# Patient Record
Sex: MALE | Race: WHITE | NOT HISPANIC OR LATINO | Employment: STUDENT | URBAN - METROPOLITAN AREA
[De-identification: names, ages, dates, MRNs, and addresses within clinical notes are randomized per-mention and may not be internally consistent; named-entity substitution may affect disease eponyms.]

---

## 2024-09-05 ENCOUNTER — OFFICE VISIT (OUTPATIENT)
Dept: PODIATRY | Facility: CLINIC | Age: 24
End: 2024-09-05
Payer: COMMERCIAL

## 2024-09-05 VITALS
SYSTOLIC BLOOD PRESSURE: 128 MMHG | HEART RATE: 82 BPM | DIASTOLIC BLOOD PRESSURE: 67 MMHG | BODY MASS INDEX: 22.73 KG/M2 | HEIGHT: 68 IN | WEIGHT: 150 LBS

## 2024-09-05 DIAGNOSIS — M24.573 EQUINUS CONTRACTURE OF ANKLE: ICD-10-CM

## 2024-09-05 DIAGNOSIS — M79.671 FOOT PAIN, BILATERAL: ICD-10-CM

## 2024-09-05 DIAGNOSIS — M79.672 FOOT PAIN, BILATERAL: ICD-10-CM

## 2024-09-05 DIAGNOSIS — M72.2 PLANTAR FASCIITIS: Primary | ICD-10-CM

## 2024-09-05 PROBLEM — I37.0 NONRHEUMATIC PULMONARY VALVE STENOSIS: Status: ACTIVE | Noted: 2024-09-05

## 2024-09-05 PROCEDURE — 99999 PR PBB SHADOW E&M-NEW PATIENT-LVL II: CPT | Mod: PBBFAC,,, | Performed by: PODIATRIST

## 2024-09-05 RX ORDER — EMTRICITABINE AND TENOFOVIR DISOPROXIL FUMARATE 200; 300 MG/1; MG/1
1 TABLET, FILM COATED ORAL
COMMUNITY
Start: 2024-08-09

## 2024-09-05 RX ORDER — MELOXICAM 15 MG/1
15 TABLET ORAL DAILY
Qty: 30 TABLET | Refills: 0 | Status: SHIPPED | OUTPATIENT
Start: 2024-09-05

## 2024-09-05 RX ORDER — ISOTRETINOIN 40 MG/1
40 CAPSULE ORAL 2 TIMES DAILY
COMMUNITY
Start: 2024-04-24

## 2024-09-05 RX ORDER — ESCITALOPRAM OXALATE 10 MG/1
10 TABLET ORAL
COMMUNITY
Start: 2024-07-15

## 2024-09-05 NOTE — PROGRESS NOTES
Subjective:      Patient ID: Kenji Weldon is a 23 y.o. male.    Chief Complaint: Plantar Fasciitis (Bilateral foot)    Sharp deep pain in the bottom of both heels.  This chronic condition present with periods of worsening improvement over many years.  This exacerbations been gradual onset, worsening over the past 2 months.  Aggravated with increased weight-bearing particularly after rest.  Current medical treatment has included over-the-counter arch supports in his current shoes, he just bought new shoes yet but has not tried them with the inserts.  Self-treatment with stretches has been minimal help.  Denies trauma and surgery both feet    Review of Systems   Constitutional: Negative for chills, diaphoresis, fever, malaise/fatigue and night sweats.   Cardiovascular:  Negative for claudication, cyanosis, leg swelling and syncope.   Skin:  Negative for color change, dry skin, nail changes, rash, suspicious lesions and unusual hair distribution.   Musculoskeletal:  Negative for falls, joint pain, joint swelling, muscle cramps, muscle weakness and stiffness.   Gastrointestinal:  Negative for constipation, diarrhea, nausea and vomiting.   Neurological:  Negative for brief paralysis, disturbances in coordination, focal weakness, numbness, paresthesias, sensory change and tremors.         Objective:      Physical Exam  Constitutional:       General: He is not in acute distress.     Appearance: He is well-developed. He is not diaphoretic.   Cardiovascular:      Pulses:           Popliteal pulses are 2+ on the right side and 2+ on the left side.        Dorsalis pedis pulses are 2+ on the right side and 2+ on the left side.        Posterior tibial pulses are 2+ on the right side and 2+ on the left side.      Comments: Capillary refill 3 seconds all toes/distal feet, all toes/both feet warm to touch.      Negative lymphadenopathy bilateral popliteal fossa and tarsal tunnel.      Negavie lower extremity edema  bilateral.    Musculoskeletal:      Right ankle: No swelling, deformity, ecchymosis or lacerations. Normal range of motion. Normal pulse.      Right Achilles Tendon: Normal. No defects. Martinez's test negative.      Comments: Sharp deep pain to palpation inferior heel right and left at medial calcaneal tubercle without ecchymosis, erythema, edema, or cardinal signs infection, and no signs of trauma.    Ankle dorsiflexion decreased at <10 degrees bilateral with moderate increase with knee flexion bilateral.    Otherwise, Normal angle, base, station of gait. All ten toes without clubbing, cyanosis, or signs of ischemia.  No pain to palpation bilateral lower extremities.  Range of motion, stability, muscle strength, and muscle tone normal bilateral feet and legs.    Lymphadenopathy:      Lower Body: No right inguinal adenopathy. No left inguinal adenopathy.      Comments: Negative lymphadenopathy bilateral popliteal fossa and tarsal tunnel.    Negative lymphangitic streaking bilateral feet/ankles/legs.   Skin:     General: Skin is warm and dry.      Capillary Refill: Capillary refill takes 2 to 3 seconds.      Coloration: Skin is not pale.      Findings: No abrasion, bruising, burn, ecchymosis, erythema, laceration, lesion or rash.      Nails: There is no clubbing.      Comments: Skin is normal age and health appropriate color, turgor, texture, and temperature bilateral lower extremities without ulceration, hyperpigmentation, discoloration, masses nodules or cords palpated.  No ecchymosis, erythema, edema, or cardinal signs of infection bilateral lower extremities.       Neurological:      Mental Status: He is alert and oriented to person, place, and time.      Sensory: No sensory deficit.      Motor: No tremor, atrophy or abnormal muscle tone.      Gait: Gait normal.      Deep Tendon Reflexes:      Reflex Scores:       Patellar reflexes are 2+ on the right side and 2+ on the left side.       Achilles reflexes are 2+  "on the right side and 2+ on the left side.     Comments: Negative tinel sign to percussion sural, superficial peroneal, deep peroneal, saphenous, and posterior tibial nerves right and left ankles and feet.     Psychiatric:         Behavior: Behavior is cooperative.           Assessment:       Encounter Diagnoses   Name Primary?    Plantar fasciitis Yes    Foot pain, bilateral     Equinus contracture of ankle          Plan:       Kenji Viveros" was seen today for plantar fasciitis.    Diagnoses and all orders for this visit:    Plantar fasciitis    Foot pain, bilateral    Equinus contracture of ankle    Other orders  -     meloxicam (MOBIC) 15 MG tablet; Take 1 tablet (15 mg total) by mouth once daily.      I counseled the patient on his conditions, their implications and medical management.        Patient will stretch the tendo achilles complex three times daily as demonstrated in the office.  Literature was dispensed illustrating proper stretching technique.    I applied a plantar rest strapping to the patient's right and left foot to offload symptomatic area, support the arch, and relieve pain.    Patient will obtain over the counter arch supports and wear them in shoes whenever possible.  Athletic shoes intended for walking or running are usually best.    The patient was advised that NSAID-type medications have two very important potential side effects: gastrointestinal irritation including hemorrhage and renal injuries. He was asked to take the medication with food and to stop if he experiences any GI upset. I asked him to call for vomiting, abdominal pain or black/bloody stools. The patient expresses understanding of these issues and questions were answered.    Discussed conservative treatment with shoes of adequate dimensions, material, and style to alleviate symptoms and delay or prevent surgical intervention.    Meloxicam    1 month          Follow up in about 6 weeks (around 10/17/2024).        "

## 2024-09-05 NOTE — PROGRESS NOTES
"       Cardiology Clinic Note  Reason for Visit: PS    HPI:     Kenji Weldon is a 23 y.o. male, who presents for PS.    He was referred from [facility] to establish care for congenital pulmonary stenosis.  He is from [place]. 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  Last seen in cardiology 3-4y ago.    He is active. No symptoms with exercise.    He has been followed for PS and a hole in his heart.    Medical: pulmonic stenosis  Other relevant histories: No family h/o premature cardiac disease.    ROS:    Pertinent ROS included in HPI and below.  PMH:     Patient Active Problem List   Diagnosis    Nonrheumatic pulmonary valve stenosis     Myke  has no past surgical history on file.  Allergies:     Review of patient's allergies indicates:   Allergen Reactions    Peanut Nausea And Vomiting and Swelling     Medications:     Current Outpatient Medications   Medication Instructions    AMNESTEEM 40 mg, Oral, 2 times daily    emtricitabine-tenofovir 200-300 mg (TRUVADA) 200-300 mg Tab 1 tablet, Oral    EScitalopram oxalate (LEXAPRO) 10 mg, Oral     Social History:     Social History     Tobacco Use    Smoking status: Never    Smokeless tobacco: Never   Substance Use Topics    Alcohol use: Not on file     Physical Exam:     BP Readings from Last 3 Encounters:   09/05/24 128/67      Pulse Readings from Last 3 Encounters:   09/05/24 82      Wt Readings from Last 3 Encounters:   09/05/24 1335 68 kg (150 lb)       Constitutional: No apparent distress, conversant  Neck: No jugular venous distension, no carotid bruits  CV: Regular rate and rhythm, 1/6 systolic murmur at LUSB  Pulm: Clear to auscultation bilaterally  Extremities: No lower extremity edema, warm with palpable pulses    Labs:     Blood Tests:  No results found for: "BNP", "NA", "K", "CL", "CO2", "BUN", "CREATININE", "GLU", "HGBA1C", "MG", "AST", "ALT", "ALBUMIN", "PROT", "BILITOT", "WBC", "HGB", "HCT", "MCV", "PLT", "INR", "PSA", "TSH"    No " "results found for: "CHOL", "HDL", "LDL", "TRIG"    No results found for: "LDLCALC"    Urine Tests:  No results found for: "COLORU", "APPEARANCEUA", "PHUR", "SPECGRAV", "PROTEINUA", "GLUCUA", "KETONESU", "BILIRUBINUA", "OCCULTUA", "NITRITE", "UROBILINOGEN", "LEUKOCYTESUR", "PROTEINURINE", "CREATRANDUR", "UTPCR"    Imaging:     Echocardiogram/Echo Stress Testing  No results found for this or any previous visit.    Nuclear stress testing  None    Cath Lab  None    Other  None    EKG:   EKG (9/9/24): normal EKG, normal sinus rhythm. (personally reviewed)    Assessment:     1. Nonrheumatic pulmonary valve stenosis        Plan:     Nonrheumatic pulmonary valve stenosis  Asymptomatic   Obtain echo    Signed:  Nader Francisoc MD  Cardiology     Follow-up:     Future Appointments   Date Time Provider Department Center   9/9/2024 10:00 AM Pop Francisco III, MD [facility]         "

## 2024-09-09 ENCOUNTER — OFFICE VISIT (OUTPATIENT)
Dept: CARDIOLOGY | Facility: CLINIC | Age: 24
End: 2024-09-09
Payer: COMMERCIAL

## 2024-09-09 VITALS
HEIGHT: 68 IN | DIASTOLIC BLOOD PRESSURE: 78 MMHG | WEIGHT: 150.13 LBS | OXYGEN SATURATION: 98 % | SYSTOLIC BLOOD PRESSURE: 118 MMHG | BODY MASS INDEX: 22.75 KG/M2 | HEART RATE: 88 BPM

## 2024-09-09 DIAGNOSIS — I37.0 NONRHEUMATIC PULMONARY VALVE STENOSIS: Primary | ICD-10-CM

## 2024-09-09 LAB
OHS QRS DURATION: 84 MS
OHS QTC CALCULATION: 423 MS

## 2024-09-09 PROCEDURE — 1159F MED LIST DOCD IN RCRD: CPT | Mod: CPTII,S$GLB,, | Performed by: INTERNAL MEDICINE

## 2024-09-09 PROCEDURE — 3074F SYST BP LT 130 MM HG: CPT | Mod: CPTII,S$GLB,, | Performed by: INTERNAL MEDICINE

## 2024-09-09 PROCEDURE — 3078F DIAST BP <80 MM HG: CPT | Mod: CPTII,S$GLB,, | Performed by: INTERNAL MEDICINE

## 2024-09-09 PROCEDURE — 99214 OFFICE O/P EST MOD 30 MIN: CPT | Mod: S$GLB,,, | Performed by: INTERNAL MEDICINE

## 2024-09-09 PROCEDURE — 99999 PR PBB SHADOW E&M-EST. PATIENT-LVL III: CPT | Mod: PBBFAC,,, | Performed by: INTERNAL MEDICINE

## 2024-09-09 PROCEDURE — 3008F BODY MASS INDEX DOCD: CPT | Mod: CPTII,S$GLB,, | Performed by: INTERNAL MEDICINE

## 2024-09-09 PROCEDURE — 93000 ELECTROCARDIOGRAM COMPLETE: CPT | Mod: S$GLB,,, | Performed by: STUDENT IN AN ORGANIZED HEALTH CARE EDUCATION/TRAINING PROGRAM

## 2024-09-16 ENCOUNTER — HOSPITAL ENCOUNTER (OUTPATIENT)
Dept: CARDIOLOGY | Facility: HOSPITAL | Age: 24
Discharge: HOME | End: 2024-09-16
Attending: INTERNAL MEDICINE
Payer: COMMERCIAL

## 2024-09-16 VITALS
SYSTOLIC BLOOD PRESSURE: 116 MMHG | HEART RATE: 78 BPM | BODY MASS INDEX: 22.73 KG/M2 | DIASTOLIC BLOOD PRESSURE: 64 MMHG | WEIGHT: 150 LBS | HEIGHT: 68 IN

## 2024-09-16 DIAGNOSIS — I37.0 NONRHEUMATIC PULMONARY VALVE STENOSIS: ICD-10-CM

## 2024-09-16 LAB
ASCENDING AORTA: 2.15 CM
AV INDEX (PROSTH): 0.87
AV MEAN GRADIENT: 3 MMHG
AV PEAK GRADIENT: 6 MMHG
AV VALVE AREA BY VELOCITY RATIO: 2.6 CM²
AV VALVE AREA: 2.62 CM²
AV VELOCITY RATIO: 0.86
BSA FOR ECHO PROCEDURE: 1.81 M2
CV ECHO LV RWT: 0.21 CM
DOP CALC AO PEAK VEL: 1.22 M/S
DOP CALC AO VTI: 23.87 CM
DOP CALC LVOT AREA: 3 CM2
DOP CALC LVOT DIAMETER: 1.96 CM
DOP CALC LVOT PEAK VEL: 1.05 M/S
DOP CALC LVOT STROKE VOLUME: 62.48 CM3
DOP CALC RVOT PEAK VEL: 0.65 M/S
DOP CALC RVOT VTI: 16.04 CM
DOP CALCLVOT PEAK VEL VTI: 20.72 CM
E WAVE DECELERATION TIME: 131.31 MSEC
E/A RATIO: 2.19
E/E' RATIO: 3.89 M/S
ECHO LV POSTERIOR WALL: 0.52 CM (ref 0.6–1.1)
FRACTIONAL SHORTENING: 28 % (ref 28–44)
INTERVENTRICULAR SEPTUM: 0.55 CM (ref 0.6–1.1)
IVRT: 94.2 MSEC
LA MAJOR: 5.34 CM
LA MINOR: 4.36 CM
LA WIDTH: 3.32 CM
LEFT ATRIUM SIZE: 3.47 CM
LEFT ATRIUM VOLUME INDEX MOD: 20.5 ML/M2
LEFT ATRIUM VOLUME INDEX: 26 ML/M2
LEFT ATRIUM VOLUME MOD: 37.18 CM3
LEFT ATRIUM VOLUME: 47.01 CM3
LEFT INTERNAL DIMENSION IN SYSTOLE: 3.6 CM (ref 2.1–4)
LEFT VENTRICLE DIASTOLIC VOLUME INDEX: 66.1 ML/M2
LEFT VENTRICLE DIASTOLIC VOLUME: 119.64 ML
LEFT VENTRICLE MASS INDEX: 46 G/M2
LEFT VENTRICLE SYSTOLIC VOLUME INDEX: 30 ML/M2
LEFT VENTRICLE SYSTOLIC VOLUME: 54.27 ML
LEFT VENTRICULAR INTERNAL DIMENSION IN DIASTOLE: 5.03 CM (ref 3.5–6)
LEFT VENTRICULAR MASS: 83.56 G
LV LATERAL E/E' RATIO: 3.5 M/S
LV SEPTAL E/E' RATIO: 4.38 M/S
MV A" WAVE DURATION": 6.47 MSEC
MV PEAK A VEL: 0.32 M/S
MV PEAK E VEL: 0.7 M/S
MV STENOSIS PRESSURE HALF TIME: 38.08 MS
MV VALVE AREA P 1/2 METHOD: 5.78 CM2
PULM VEIN S/D RATIO: 1.16
PV MEAN GRADIENT: 9 MMHG
PV PEAK D VEL: 0.43 M/S
PV PEAK GRADIENT: 14 MMHG
PV PEAK S VEL: 0.5 M/S
PV PEAK VELOCITY: 1.85 M/S
PV VTI: 47.6 CM
RA MAJOR: 4.85 CM
RA PRESSURE ESTIMATED: 3 MMHG
RA WIDTH: 3.84 CM
SINUS: 2.54 CM
STJ: 2.15 CM
TDI LATERAL: 0.2 M/S
TDI SEPTAL: 0.16 M/S
TDI: 0.18 M/S
TRICUSPID ANNULAR PLANE SYSTOLIC EXCURSION: 2.14 CM
Z-SCORE OF LEFT VENTRICULAR DIMENSION IN END DIASTOLE: 0.04
Z-SCORE OF LEFT VENTRICULAR DIMENSION IN END SYSTOLE: 1.2

## 2024-09-16 PROCEDURE — 93306 TTE W/DOPPLER COMPLETE: CPT

## 2024-09-16 PROCEDURE — 93306 TTE W/DOPPLER COMPLETE: CPT | Mod: 26,,, | Performed by: INTERNAL MEDICINE

## 2024-09-17 ENCOUNTER — TELEPHONE (OUTPATIENT)
Dept: CARDIOLOGY | Facility: CLINIC | Age: 24
End: 2024-09-17
Payer: COMMERCIAL

## 2024-09-17 NOTE — TELEPHONE ENCOUNTER
----- Message from Pop Francisco III, MD sent at 9/17/2024  3:51 PM CDT -----  Can you please relay the results to him?     The echocardiogram showed normal pumping function of his heart. The pulmonic valve is minimally narrowed. In terms of grading the severity of the narrowing, it is mild. No significant change compared to prior echos. This is something that we can monitor by echo every 5 years. The heart valves are structurally normal. The pressure in the lungs is normal. No signs of abnormal communications between heart chambers.     No changes at this time.    Thanks,  Nader Francisco

## 2024-09-17 NOTE — PROGRESS NOTES
Can you please relay the results to him?     The echocardiogram showed normal pumping function of his heart. The pulmonic valve is minimally narrowed. In terms of grading the severity of the narrowing, it is mild. No significant change compared to prior echos. This is something that we can monitor by echo every 5 years. The heart valves are structurally normal. The pressure in the lungs is normal. No signs of abnormal communications between heart chambers.     No changes at this time.    Thanks,  Nader Francisco

## 2024-10-01 ENCOUNTER — OFFICE VISIT (OUTPATIENT)
Dept: PODIATRY | Facility: CLINIC | Age: 24
End: 2024-10-01
Payer: COMMERCIAL

## 2024-10-01 VITALS — HEIGHT: 68 IN | WEIGHT: 149.94 LBS | BODY MASS INDEX: 22.72 KG/M2

## 2024-10-01 DIAGNOSIS — M79.672 FOOT PAIN, BILATERAL: ICD-10-CM

## 2024-10-01 DIAGNOSIS — M72.2 PLANTAR FASCIITIS: Primary | ICD-10-CM

## 2024-10-01 DIAGNOSIS — M24.573 EQUINUS CONTRACTURE OF ANKLE: ICD-10-CM

## 2024-10-01 DIAGNOSIS — M79.671 FOOT PAIN, BILATERAL: ICD-10-CM

## 2024-10-01 PROCEDURE — 99214 OFFICE O/P EST MOD 30 MIN: CPT | Mod: S$GLB,,, | Performed by: PODIATRIST

## 2024-10-01 PROCEDURE — 1159F MED LIST DOCD IN RCRD: CPT | Mod: CPTII,S$GLB,, | Performed by: PODIATRIST

## 2024-10-01 PROCEDURE — 1160F RVW MEDS BY RX/DR IN RCRD: CPT | Mod: CPTII,S$GLB,, | Performed by: PODIATRIST

## 2024-10-01 PROCEDURE — 99999 PR PBB SHADOW E&M-EST. PATIENT-LVL III: CPT | Mod: PBBFAC,,, | Performed by: PODIATRIST

## 2024-10-01 PROCEDURE — 3008F BODY MASS INDEX DOCD: CPT | Mod: CPTII,S$GLB,, | Performed by: PODIATRIST

## 2024-10-01 NOTE — PROGRESS NOTES
Subjective:      Patient ID: Kenji Weldon is a 23 y.o. male.    Chief Complaint: Follow-up (Foot pain)    Sharp deep pain in the bottom of both heels.  This chronic condition present with periods of worsening improvement over many years.  This exacerbations been gradual onset, worsening over the past 2 months.  Aggravated with increased weight-bearing particularly after rest.  Current medical treatment has included over-the-counter arch supports in his current shoes, he just bought new shoes yet but has not tried them with the inserts.  Self-treatment with stretches has been minimal help.  Denies trauma and surgery both feet    10/1/24: Patient admits his pain has improved a little bit. Patient admits his pain has gone from a 7/10 to a 5/10. Patient admits he has been doing stretches but just for one time a day. Admits he recently got new [employer] sneakers. Patient admits meloxicam has not helped his much. Patient has had no other pedal complaints at this time.  Not at goal    Review of Systems   Constitutional: Negative for chills, diaphoresis, fever, malaise/fatigue and night sweats.   Cardiovascular:  Negative for claudication, cyanosis, leg swelling and syncope.   Skin:  Negative for color change, dry skin, nail changes, rash, suspicious lesions and unusual hair distribution.   Musculoskeletal:  Negative for falls, joint pain, joint swelling, muscle cramps, muscle weakness and stiffness.   Gastrointestinal:  Negative for constipation, diarrhea, nausea and vomiting.   Neurological:  Negative for brief paralysis, disturbances in coordination, focal weakness, numbness, paresthesias, sensory change and tremors.           Objective:      Physical Exam  Constitutional:       General: He is not in acute distress.     Appearance: He is well-developed. He is not diaphoretic.   Cardiovascular:      Pulses:           Popliteal pulses are 2+ on the right side and 2+ on the left side.        Dorsalis pedis pulses are  2+ on the right side and 2+ on the left side.        Posterior tibial pulses are 2+ on the right side and 2+ on the left side.      Comments: Capillary refill 3 seconds all toes/distal feet, all toes/both feet warm to touch.      Negative lymphadenopathy bilateral popliteal fossa and tarsal tunnel.      Negavie lower extremity edema bilateral.    Musculoskeletal:      Right ankle: No swelling, deformity, ecchymosis or lacerations. Normal range of motion. Normal pulse.      Right Achilles Tendon: Normal. No defects. Martinez's test negative.      Comments: Sharp deep pain to palpation inferior heel right and left at medial calcaneal tubercle without ecchymosis, erythema, edema, or cardinal signs infection, and no signs of trauma.    Ankle dorsiflexion decreased at <10 degrees bilateral with moderate increase with knee flexion bilateral.    Otherwise, Normal angle, base, station of gait. All ten toes without clubbing, cyanosis, or signs of ischemia.  No pain to palpation bilateral lower extremities.  Range of motion, stability, muscle strength, and muscle tone normal bilateral feet and legs.    Lymphadenopathy:      Lower Body: No right inguinal adenopathy. No left inguinal adenopathy.      Comments: Negative lymphadenopathy bilateral popliteal fossa and tarsal tunnel.    Negative lymphangitic streaking bilateral feet/ankles/legs.   Skin:     General: Skin is warm and dry.      Capillary Refill: Capillary refill takes 2 to 3 seconds.      Coloration: Skin is not pale.      Findings: No abrasion, bruising, burn, ecchymosis, erythema, laceration, lesion or rash.      Nails: There is no clubbing.      Comments: Skin is normal age and health appropriate color, turgor, texture, and temperature bilateral lower extremities without ulceration, hyperpigmentation, discoloration, masses nodules or cords palpated.  No ecchymosis, erythema, edema, or cardinal signs of infection bilateral lower extremities.       Neurological:       Mental Status: He is alert and oriented to person, place, and time.      Sensory: No sensory deficit.      Motor: No tremor, atrophy or abnormal muscle tone.      Gait: Gait normal.      Deep Tendon Reflexes:      Reflex Scores:       Patellar reflexes are 2+ on the right side and 2+ on the left side.       Achilles reflexes are 2+ on the right side and 2+ on the left side.     Comments: Negative tinel sign to percussion sural, superficial peroneal, deep peroneal, saphenous, and posterior tibial nerves right and left ankles and feet.     Psychiatric:         Behavior: Behavior is cooperative.       Biomechanical Exam:  Non weight bearing assessment:   Right (degrees) Left (degrees)   Malleolar position 15-20 15-20   Ankle DF (knee extended) 10 10   Ankle DF (knee flexed) 10 10   Heel Inversion 20 20   Heel Eversion 10 10   STJ Neutral Position 0 0   Forefoot to rearfoot (1-5) perp perp   Forefoot to Rearfoot (2-5) perp perp   First Ray Dorsiflextion 5mm 5mm   First ray plantarflextion 5mm 5mm   First ray Neutral Position 0mm 0mm   Hallux Dorsiflexion 65 65   Hallux plantarflexion 30 30      Musculoskeletal evaluation, Range of Motion    Normal Limited Excessive pain   Ankle DF  R/L     Ankle PF R/L      STJ supination R/L      STJ pronation R/L      Hallux DF R/L      Hallux PF R/L      Lesser digits DF R/L      Lesser Digits PF R/L        Muscle Strength   Right Left   Gastrocnemius 5 5   Soleus 5 5   Tib. Posterior 5 5   FDL 5 5   FHL 5 5   FDB 5 5   Tib Anterior 5 5   EDL 5 5   EHL 5 5   EDB 5 5   Peroneus Longus 5 5   Peroneus Brevis 5 5     Foot Morphology    Varus  R L    Valgus  R L      Forefoot x x           Rearfoot      x x            Sagittal Plane Planovalgus B/L    Transverse Plane Normal B/L    Ankle Morphology Equinus B/L      Digital Assessments    Right  1 2 3 4 5    Left  1 2 3 4 5      Abducted                   Adducted                   Clawtoe                   Hammer toe                  "  Mallet toetoe                   Hallux IP extensus     Hallux IP abductus         Gait Analysis  Gait Pattern: Normal      Right Left   Angle of Gait 8 8   Base of Gait 5cm 5cm       Heel Position Right Left   Contact sup sup   Mid-stance Pro Pro   Propulsion sup sup   Swing sup sup       Heel off: WNL Both  Abductory twist?  No Both           Assessment:       Encounter Diagnoses   Name Primary?    Plantar fasciitis Yes    Foot pain, bilateral     Equinus contracture of ankle            Plan:       Kenji Viveros" was seen today for follow-up.    Diagnoses and all orders for this visit:    Plantar fasciitis    Foot pain, bilateral    Equinus contracture of ankle        I counseled the patient on his conditions, their implications and medical management.      Patient will continue to stretch the tendo achilles complex three times daily as demonstrated in the office.  Literature was dispensed illustrating proper stretching technique.    Patient will continue to use over the counter arch supports and wear them in shoes whenever possible.  Athletic shoes intended for walking or running are usually best.    Discussed different treatment options with patient. Patient declined steroid injection and night splint. Patient however is amenable to physicl therapy after he comes back from his [place] hiking trip    Patient understands hiking trip can exasperate his symptoms and pain     Patient prescribed voltaren gel to be used at nightime. Patient can also use Meloxicam but not at same time at topical gel. Patient verbalized understanding     The patient was advised that NSAID-type medications have two very important potential side effects: gastrointestinal irritation including hemorrhage and renal injuries. He was asked to take the medication with food and to stop if he experiences any GI upset. I asked him to call for vomiting, abdominal pain or black/bloody stools. The patient expresses understanding of these issues and " questions were answered.    Discussed conservative treatment with shoes of adequate dimensions, material, and style to alleviate symptoms and delay or prevent surgical intervention.    1 month- 6weeks      Shannen Sandoval DPM PGY-3  Podiatric Medicine & Surgery  [facility]  Secure Chat Preferred  Mobile: [phone]  Pager: [phone]

## 2024-11-01 ENCOUNTER — DOCUMENTATION ONLY (OUTPATIENT)
Dept: CARDIOLOGY | Facility: CLINIC | Age: 24
End: 2024-11-01
Payer: COMMERCIAL

## 2025-01-01 ENCOUNTER — HOSPITAL ENCOUNTER (EMERGENCY)
Age: 25
Discharge: HOME | End: 2025-01-01
Attending: STUDENT IN AN ORGANIZED HEALTH CARE EDUCATION/TRAINING PROGRAM

## 2025-01-01 VITALS
HEART RATE: 78 BPM | SYSTOLIC BLOOD PRESSURE: 112 MMHG | OXYGEN SATURATION: 95 % | DIASTOLIC BLOOD PRESSURE: 55 MMHG | RESPIRATION RATE: 15 BRPM

## 2025-01-01 DIAGNOSIS — T78.40XA ALLERGIC REACTION, INITIAL ENCOUNTER: Primary | ICD-10-CM

## 2025-01-01 PROCEDURE — 99283 EMERGENCY DEPT VISIT LOW MDM: CPT

## 2025-01-01 PROCEDURE — 10002803 HB RX 637: Performed by: STUDENT IN AN ORGANIZED HEALTH CARE EDUCATION/TRAINING PROGRAM

## 2025-01-01 RX ORDER — PREDNISONE 20 MG/1
40 TABLET ORAL ONCE
Status: COMPLETED | OUTPATIENT
Start: 2025-01-01 | End: 2025-01-01

## 2025-01-01 RX ORDER — PREDNISONE 20 MG/1
40 TABLET ORAL DAILY
Qty: 8 TABLET | Refills: 0 | Status: SHIPPED | OUTPATIENT
Start: 2025-01-01 | End: 2025-01-05

## 2025-01-01 RX ORDER — DIPHENHYDRAMINE HCL 25 MG
50 CAPSULE ORAL ONCE
Status: DISCONTINUED | OUTPATIENT
Start: 2025-01-01 | End: 2025-01-01 | Stop reason: HOSPADM

## 2025-01-01 RX ORDER — EPINEPHRINE 0.3 MG/.3ML
0.3 INJECTION SUBCUTANEOUS
Qty: 2 EACH | Refills: 1 | Status: SHIPPED | OUTPATIENT
Start: 2025-01-01

## 2025-01-01 RX ORDER — ALBUTEROL SULFATE 90 UG/1
2 INHALANT RESPIRATORY (INHALATION) ONCE
Status: COMPLETED | OUTPATIENT
Start: 2025-01-01 | End: 2025-01-01

## 2025-01-01 RX ORDER — FAMOTIDINE 20 MG/1
20 TABLET, FILM COATED ORAL ONCE
Status: COMPLETED | OUTPATIENT
Start: 2025-01-01 | End: 2025-01-01

## 2025-01-01 RX ADMIN — FAMOTIDINE 20 MG: 20 TABLET ORAL at 00:59

## 2025-01-01 RX ADMIN — ALBUTEROL SULFATE 2 PUFF: 90 AEROSOL, METERED RESPIRATORY (INHALATION) at 00:59

## 2025-01-01 RX ADMIN — PREDNISONE 40 MG: 20 TABLET ORAL at 00:59

## 2025-06-11 ENCOUNTER — TELEPHONE (OUTPATIENT)
Dept: PODIATRY | Facility: CLINIC | Age: 25
End: 2025-06-11
Payer: COMMERCIAL

## 2025-06-11 NOTE — TELEPHONE ENCOUNTER
Left message for patient to give callback.    ----- Message from Tech Morena sent at 6/11/2025  9:48 AM CDT -----  Regarding: Patient call back  .Type: Patient Call BackWho called:self What is the request in detail:calling in regards to getting plantar fascitis inserts. States he forgot the name/brand of the inserts asking for a call back to get that information.Can the clinic reply by [facility]?noWould the patient rather a call back or a response via [facility]? [facility] call back number:.[phone] Information:

## 2025-06-11 NOTE — TELEPHONE ENCOUNTER
Staff informed patient the insert OTC is called Polly and named a few places he can get the product from.    Patient verbalized understanding.        ----- Message from Summer sent at 6/11/2025 10:24 AM CDT -----  Regarding: return call  Type:  Patient Returning Call Who Called:ptWho Left Message for Patient:ebonyDoes the patient know what this is regarding?:yesWould the patient rather a call back or a response via [facility]? [facility] Call Back Number: [phone] Information: